# Patient Record
Sex: MALE
[De-identification: names, ages, dates, MRNs, and addresses within clinical notes are randomized per-mention and may not be internally consistent; named-entity substitution may affect disease eponyms.]

---

## 2020-02-07 PROBLEM — Z00.00 ENCOUNTER FOR PREVENTIVE HEALTH EXAMINATION: Status: ACTIVE | Noted: 2020-02-07

## 2020-02-14 ENCOUNTER — APPOINTMENT (OUTPATIENT)
Dept: OTOLARYNGOLOGY | Facility: CLINIC | Age: 40
End: 2020-02-14
Payer: COMMERCIAL

## 2020-02-14 VITALS
DIASTOLIC BLOOD PRESSURE: 78 MMHG | WEIGHT: 190 LBS | BODY MASS INDEX: 35.87 KG/M2 | HEART RATE: 74 BPM | HEIGHT: 61 IN | SYSTOLIC BLOOD PRESSURE: 115 MMHG

## 2020-02-14 DIAGNOSIS — H90.5 UNSPECIFIED SENSORINEURAL HEARING LOSS: ICD-10-CM

## 2020-02-14 PROCEDURE — 92557 COMPREHENSIVE HEARING TEST: CPT

## 2020-02-14 PROCEDURE — 99204 OFFICE O/P NEW MOD 45 MIN: CPT | Mod: 25

## 2020-02-14 PROCEDURE — 92567 TYMPANOMETRY: CPT

## 2020-02-14 NOTE — REASON FOR VISIT
[Initial Evaluation] : an initial evaluation for [[employer] ] : provided by [employer]   [Source: ______] : History obtained from [unfilled]

## 2020-02-17 PROBLEM — H90.5 ASYMMETRIC SNHL (SENSORINEURAL HEARING LOSS): Status: ACTIVE | Noted: 2020-02-17

## 2020-02-17 NOTE — CONSULT LETTER
[Dear  ___] : Dear  [unfilled], [Consult Letter:] : I had the pleasure of evaluating your patient, [unfilled]. [Please see my note below.] : Please see my note below. [Consult Closing:] : Thank you very much for allowing me to participate in the care of this patient.  If you have any questions, please do not hesitate to contact me. [Sincerely,] : Sincerely, [FreeTextEntry3] : Leslie Hubbard MD\par Otolaryngology and Cranial Base Surgery\par Attending Physician - Department of Otolaryngology and Head & Neck Surgery\par [facility]\par \par [employer] [facility]\par

## 2020-02-17 NOTE — ASSESSMENT
[FreeTextEntry1] : Right SNHL:\par - notes occurred over 20yrs ago and he reports having had an MRI to investigate a few years ago which was normal\par - recommend HAE and gave pt info to make this appt\par - f/u for yearly audiogram or sooner if any new issues

## 2020-02-17 NOTE — HISTORY OF PRESENT ILLNESS
[de-identified] : Patient was recommended to come in by Dr. Agarwal for right side hearing loss. Had HL suddenly associated with pain about 20 years ago. He had MRI about 3 years ago and it was normal per the patient.

## 2020-03-13 ENCOUNTER — APPOINTMENT (OUTPATIENT)
Dept: PHARMACY | Facility: CLINIC | Age: 40
End: 2020-03-13

## 2022-04-02 NOTE — DATA REVIEWED
[de-identified] : audio shows right low and high frequency SNHL, b/l type C tymps, right discrim 88% and left 100% show